# Patient Record
Sex: MALE | Race: OTHER
[De-identification: names, ages, dates, MRNs, and addresses within clinical notes are randomized per-mention and may not be internally consistent; named-entity substitution may affect disease eponyms.]

---

## 2022-06-23 NOTE — PATIENT DISCUSSION
Discussed diagnosis in detail with patient. Reviewed symptoms related to cataract progression. Recommend refer to Dr. Dea Enriquez for cataract evaluation. Patient defers treatment at this time. Continue to monitor.

## 2022-06-29 RX ORDER — ROPINIROLE 0.25 MG/1
TABLET, FILM COATED ORAL
COMMUNITY
Start: 2022-02-25 | End: 2022-08-29

## 2022-06-29 RX ORDER — FUROSEMIDE 20 MG/1
TABLET ORAL
COMMUNITY
End: 2022-09-26

## 2022-06-29 RX ORDER — ALLOPURINOL 300 MG/1
TABLET ORAL
COMMUNITY
End: 2023-02-20

## 2022-06-29 RX ORDER — SIMVASTATIN 40 MG
TABLET ORAL
COMMUNITY

## 2022-06-29 RX ORDER — ASPIRIN 81 MG/1
81 TABLET ORAL DAILY
COMMUNITY

## 2022-06-29 RX ORDER — CARVEDILOL 6.25 MG/1
TABLET ORAL
COMMUNITY
End: 2022-10-10 | Stop reason: ALTCHOICE

## 2022-06-29 RX ORDER — FLUTICASONE PROPIONATE 50 MCG
SPRAY, SUSPENSION (ML) NASAL
COMMUNITY
Start: 2022-02-25

## 2022-08-23 PROBLEM — Z85.51 PERSONAL HISTORY OF BLADDER CANCER: Status: ACTIVE | Noted: 2022-08-23

## 2022-08-23 PROBLEM — R39.15 URGENCY OF URINATION: Status: ACTIVE | Noted: 2022-08-23

## 2022-08-23 PROBLEM — Z85.46 PERSONAL HISTORY OF PROSTATE CANCER: Status: ACTIVE | Noted: 2022-08-23

## 2022-08-29 PROBLEM — C67.9 MALIGNANT NEOPLASM OF URINARY BLADDER (HCC): Status: ACTIVE | Noted: 2022-08-29

## 2022-08-29 PROBLEM — D53.8 OTHER SPECIFIED NUTRITIONAL ANEMIAS: Status: ACTIVE | Noted: 2022-08-29

## 2022-08-29 PROBLEM — I73.9 CLAUDICATION (HCC): Status: ACTIVE | Noted: 2022-08-29

## 2022-08-29 PROBLEM — I50.41 ACUTE COMBINED SYSTOLIC AND DIASTOLIC HEART FAILURE (HCC): Status: ACTIVE | Noted: 2022-08-29

## 2022-08-29 PROBLEM — I25.10 CORONARY ATHEROSCLEROSIS: Status: ACTIVE | Noted: 2022-08-29

## 2022-08-29 PROBLEM — G25.81 RLS (RESTLESS LEGS SYNDROME): Status: ACTIVE | Noted: 2022-08-29

## 2022-08-29 PROBLEM — I25.5 ISCHEMIC CARDIOMYOPATHY: Status: ACTIVE | Noted: 2022-08-29

## 2022-08-29 PROBLEM — N18.30 CKD (CHRONIC KIDNEY DISEASE) STAGE 3, GFR 30-59 ML/MIN (HCC): Status: ACTIVE | Noted: 2022-08-29

## 2022-08-29 PROBLEM — K80.20 CALCULUS OF GALLBLADDER WITHOUT CHOLECYSTITIS WITHOUT OBSTRUCTION: Status: ACTIVE | Noted: 2022-08-29

## 2022-08-29 PROBLEM — K59.00 CONSTIPATION: Status: ACTIVE | Noted: 2022-08-29

## 2022-08-29 PROBLEM — C61 MALIGNANT NEOPLASM OF PROSTATE (HCC): Status: ACTIVE | Noted: 2022-08-29

## 2022-08-29 PROBLEM — S61.300D: Status: ACTIVE | Noted: 2022-08-29

## 2022-08-29 PROBLEM — I21.9 MYOCARDIAL INFARCTION (HCC): Status: ACTIVE | Noted: 2022-08-29

## 2022-08-29 PROBLEM — N32.0 BLADDER NECK OBSTRUCTION: Status: ACTIVE | Noted: 2022-08-29

## 2022-08-29 PROBLEM — I25.2 OLD MYOCARDIAL INFARCTION: Status: ACTIVE | Noted: 2022-08-29

## 2022-08-29 PROBLEM — K40.90 LEFT INGUINAL HERNIA: Status: ACTIVE | Noted: 2022-08-29

## 2022-08-29 PROBLEM — J30.2 SEASONAL ALLERGIC RHINITIS: Status: ACTIVE | Noted: 2022-08-29

## 2022-08-29 PROBLEM — Z95.810 BIVENTRICULAR AUTOMATIC IMPLANTABLE CARDIOVERTER DEFIBRILLATOR IN SITU: Status: ACTIVE | Noted: 2022-08-29

## 2022-08-29 PROBLEM — Z95.1 POSTSURGICAL AORTOCORONARY BYPASS STATUS: Status: ACTIVE | Noted: 2022-08-29

## 2022-08-29 PROBLEM — N39.0 URINARY TRACT INFECTION: Status: ACTIVE | Noted: 2022-08-29

## 2022-08-29 PROBLEM — R73.9 HYPERGLYCEMIA: Status: ACTIVE | Noted: 2022-08-29

## 2022-08-29 PROBLEM — I49.9 IRREGULAR HEART RHYTHM: Status: ACTIVE | Noted: 2022-08-29

## 2022-08-29 PROBLEM — Z86.19 HISTORY OF INFECTION WITH MICROORGANISM RESISTANT TO MULTIPLE DRUGS: Status: ACTIVE | Noted: 2022-08-29

## 2022-08-29 PROBLEM — E78.5 HYPERLIPIDEMIA: Status: ACTIVE | Noted: 2022-08-29

## 2022-08-29 PROBLEM — N10 ACUTE PYELONEPHRITIS: Status: ACTIVE | Noted: 2022-08-29

## 2022-08-29 PROBLEM — I10 ESSENTIAL HYPERTENSION: Status: ACTIVE | Noted: 2022-08-29

## 2022-08-29 PROBLEM — K43.9 EPIGASTRIC HERNIA: Status: ACTIVE | Noted: 2022-08-29

## 2022-08-29 PROBLEM — M10.9 GOUT: Status: ACTIVE | Noted: 2022-08-29

## 2022-08-29 PROBLEM — C44.311 BASAL CELL CARCINOMA OF NOSE: Status: ACTIVE | Noted: 2022-08-29

## 2022-09-28 PROBLEM — N39.0 URINARY TRACT INFECTION: Status: RESOLVED | Noted: 2022-08-29 | Resolved: 2022-09-28

## 2022-10-07 PROBLEM — I50.22 CHRONIC SYSTOLIC (CONGESTIVE) HEART FAILURE (HCC): Status: ACTIVE | Noted: 2022-10-07

## 2024-12-10 ENCOUNTER — COMPREHENSIVE EXAM (OUTPATIENT)
Age: 89
End: 2024-12-10

## 2024-12-10 DIAGNOSIS — H25.812: ICD-10-CM

## 2024-12-10 DIAGNOSIS — Z01.00: ICD-10-CM

## 2024-12-10 PROCEDURE — 92004 COMPRE OPH EXAM NEW PT 1/>: CPT

## 2025-01-03 ENCOUNTER — COMPREHENSIVE EXAM (OUTPATIENT)
Age: OVER 89
End: 2025-01-03

## 2025-01-03 DIAGNOSIS — H25.12: ICD-10-CM

## 2025-01-03 DIAGNOSIS — Z96.1: ICD-10-CM

## 2025-01-03 PROCEDURE — 92136 OPHTHALMIC BIOMETRY: CPT

## 2025-01-03 PROCEDURE — 99214 OFFICE O/P EST MOD 30 MIN: CPT

## 2025-02-12 ENCOUNTER — SURGERY/PROCEDURE (OUTPATIENT)
Age: OVER 89
End: 2025-02-12

## 2025-02-12 DIAGNOSIS — H25.12: ICD-10-CM

## 2025-02-12 PROCEDURE — 66984 XCAPSL CTRC RMVL W/O ECP: CPT

## 2025-02-13 ENCOUNTER — POST-OP (OUTPATIENT)
Age: OVER 89
End: 2025-02-13

## 2025-02-13 DIAGNOSIS — Z96.1: ICD-10-CM

## 2025-03-06 ENCOUNTER — POST-OP (OUTPATIENT)
Age: OVER 89
End: 2025-03-06

## 2025-03-06 DIAGNOSIS — Z98.890: ICD-10-CM

## 2025-03-06 PROCEDURE — 99024 POSTOP FOLLOW-UP VISIT: CPT

## 2025-04-03 ENCOUNTER — POST-OP (OUTPATIENT)
Age: OVER 89
End: 2025-04-03

## 2025-04-03 DIAGNOSIS — Z96.1: ICD-10-CM

## 2025-04-03 PROCEDURE — 99024 POSTOP FOLLOW-UP VISIT: CPT
